# Patient Record
(demographics unavailable — no encounter records)

---

## 2024-11-13 NOTE — HISTORY OF PRESENT ILLNESS
[FreeTextEntry1] : CPE [de-identified] : Here for f/u neck pain and back pain she is requesting massage therapy prescription. Massage and acupuncture are helping her neck pain and sciatica. (Malka Piper massage Therapist and Acupuncturist).  no blood in stool.  c/o constipation chronic. She has vertical pigmented lines on right thumb nail for years- checked by dermatology dr. Carter in summer, 2024.She saw pelvic  in July 2024.  She will see eye doctor dr. Robison due to vision concerns- FH of Macular degeneration.

## 2024-11-13 NOTE — HEALTH RISK ASSESSMENT
[Good] : ~his/her~  mood as  good [No] : In the past 12 months have you used drugs other than those required for medical reasons? No [No falls in past year] : Patient reported no falls in the past year [0] : 2) Feeling down, depressed, or hopeless: Not at all (0) [PHQ-2 Negative - No further assessment needed] : PHQ-2 Negative - No further assessment needed [Never] : Never [NO] : No [No Retinopathy] : No retinopathy [Patient reported mammogram was normal] : Patient reported mammogram was normal [Patient reported PAP Smear was normal] : Patient reported PAP Smear was normal [Patient reported bone density results were abnormal] : Patient reported bone density results were abnormal [Patient reported colonoscopy was normal] : Patient reported colonoscopy was normal [HIV test declined] : HIV test declined [Hepatitis C test declined] : Hepatitis C test declined [None] : None [Alone] : lives alone [Employed] : employed [Graduate School] : graduate school [] :  [Feels Safe at Home] : Feels safe at home [Fully functional (bathing, dressing, toileting, transferring, walking, feeding)] : Fully functional (bathing, dressing, toileting, transferring, walking, feeding) [Fully functional (using the telephone, shopping, preparing meals, housekeeping, doing laundry, using] : Fully functional and needs no help or supervision to perform IADLs (using the telephone, shopping, preparing meals, housekeeping, doing laundry, using transportation, managing medications and managing finances) [Independent] : managing finances [Reports normal functional visual acuity (ie: able to read med bottle)] : Reports normal functional visual acuity [Smoke Detector] : smoke detector [Carbon Monoxide Detector] : carbon monoxide detector [Seat Belt] :  uses seat belt [Sunscreen] : uses sunscreen [With Patient/Caregiver] : , with patient/caregiver [de-identified] : Malka Piper massage Therapist [phone] , [place] acupunture dr. Maverick Ingram [phone] , [place] Mr. Loiue Piper massage Therapist   Charity Rice   [de-identified] : regular, pickle ball, strength training twice  aweek  [de-identified] : regular [[id number]] : 0 [EyeExamDate] : 01/24 [Change in mental status noted] : No change in mental status noted [Language] : denies difficulty with language [Learning/Retaining New Information] : denies difficulty learning/retaining new information [Handling Complex Tasks] : denies difficulty handling complex tasks [Reasoning] : denies difficulty with reasoning [Spatial Ability and Orientation] : denies difficulty with spatial ability and orientation [Sexually Active] : not sexually active [Reports changes in hearing] : Reports no changes in hearing [Reports changes in vision] : Reports no changes in vision [Reports changes in dental health] : Reports no changes in dental health [MammogramDate] : 09/23 [MammogramComments] : dr. Garay  [PapSmearDate] : 08/23 [BoneDensityDate] : 09/23 [BoneDensityComments] : Osteopenia [ColonoscopyDate] : 08/23 [ColonoscopyComments] :  repeat in 10 years,dr. Rodas  [FreeTextEntry2] :   [AdvancecareDate] : 11/24

## 2024-11-13 NOTE — PLAN
[FreeTextEntry1] : weight bearing exercises. massage therapy for neck, Myofacial pain release and sciatica.  fasting labs obtained. Continue healthy diet and exercise.   Fiber gummy, prune, smooth move tea,

## 2025-02-24 NOTE — PHYSICAL EXAM
[No Acute Distress] : no acute distress [Well Nourished] : well nourished [EOMI] : extraocular movements intact [Supple] : supple [Clear to Auscultation] : lungs were clear to auscultation bilaterally [Normal S1, S2] : normal S1 and S2 [No Edema] : there was no peripheral edema [Soft] : abdomen soft [Non Tender] : non-tender [No CVA Tenderness] : no CVA  tenderness [No Rash] : no rash [Coordination Grossly Intact] : coordination grossly intact [No Focal Deficits] : no focal deficits [Normal Gait] : normal gait [Deep Tendon Reflexes (DTR)] : deep tendon reflexes were 2+ and symmetric [Normal Affect] : the affect was normal

## 2025-02-24 NOTE — HISTORY OF PRESENT ILLNESS
[FreeTextEntry8] : Ms. Renetta Aviles is a 66 yo female presents today for symptoms of dizziness/vertigo. Pt reports she woke this morning plopped out bed, and went to have BM. Pt reports then return back to back, plopped back in, suddenly started room spinning dizziness. Pt did have nausea and vomiting once, associated with that head ache. Pt advised today ECG wnl, will get labs, and recommended no sudden head movements, ample hydration advised, and Epley maneuver exercises, trial of meclizine. Pt advised will also provide referral to neurology if symptoms don't lui.

## 2025-04-23 NOTE — HISTORY OF PRESENT ILLNESS
[FreeTextEntry1] : follow up [de-identified] : Ms. Renetta Aviles is a 66 yo female presents today for routine follow up for labs and script for mammogram and DEXA. Pt also reports today feels well. Pt implementing diet and exercise, and lifestyle choices to mitigate hld.

## 2025-05-22 NOTE — PHYSICAL EXAM
[de-identified] : Constitutional: Well-nourished, well-developed, No acute distress Respiratory:  Good respiratory effort, no SOB Lymphatic: No regional lymphadenopathy, no lymphedema Psychiatric: Pleasant and normal affect, alert and oriented x3 Musculoskeletal: normal except where as noted in regional exam   Right Shoulder: APPEARANCE: no marked deformities, no swelling or malalignment POSITIVE TENDERNESS: supraspinatus, long head biceps tendon NONTENDER:  infraspinatus, teres minor. biceps. anterior and posterior capsule. AC joint.  ROM: full with mild painful arc past 60 degrees, no scapular winging or dyskinesia present RESISTIVE TESTING: MMT 4+/5 ER, Flexion and Empty can, 5/5 IR. painless 5/5 resisted ext, horizontal abd/add  SPECIAL TESTS: + Collins and Neers, mildly + cross arm adduction, + Speeds, neg Lai's, neg Drop Arm, neg Apprehension. neg apley's scratch test  [de-identified] : The following radiographs were ordered and read by me during this patient's visit. I reviewed each radiograph in detail with the patient and discussed the findings as highlighted below.   3 views of the right shoulder were obtained today that show no fracture, or dislocation. There are no significant degenerative changes seen, however there is slight superior migration of the humeral head. There is no malalignment. No obvious osseous abnormality. Otherwise unremarkable.  Low Dose Naltrexone Pregnancy And Lactation Text: Naltrexone is pregnancy category C.  There have been no adequate and well-controlled studies in pregnant women.  It should be used in pregnancy only if the potential benefit justifies the potential risk to the fetus.   Limited data indicates that naltrexone is minimally excreted into breastmilk.

## 2025-05-22 NOTE — HISTORY OF PRESENT ILLNESS
[de-identified] : 68 y/o RHD female presenting to the office complaining of right shoulder pain. Patient reports pain for the past 1 year progressively worsening since fall of 2024. The patient describes the pain as a dull aching, and occasionally sharp pain localized to the anterior aspect of her shoulder hat is intermittent in nature. Her symptoms are exacerbated with overhead lifting, lifting heavy objects and repetitive activity any movement of the shoulder. Patient reports the pain is waking her up at night. Patient reports associated weakness and a restricted range of motion. Denies numbness and tingling in the upper extremity. Patient is not taking pain med sat this time. Pt had PT in 2024, this provided mild symptom relief.

## 2025-05-22 NOTE — DISCUSSION/SUMMARY
[de-identified] : Discussed findings of today's exam and possible causes of patient's pain.  Educated patient on their most probable diagnosis of chronic intermittent right shoulder pain with recent atraumatic exacerbation due to subacromial impingement and bicipital tenosynovitis..  Reviewed possible courses of treatment, and we collaboratively decided best course of treatment at this time will include conservative management.  Based on clinical exam it seems that the primary etiology of the patient's pain is her bicipital tenosynovitis.  Patient has already tried oral NSAIDs and activity modification regarding her pickleball without pain relief.  We discussed various treatment options as well as associated risk/benefits/alternatives and patient elected to proceed with ultrasound-guided right biceps tendon sheath cortisone injection today (see procedure note).  Informed the patient that the numbing medicine in today's injection will last for about 4-6 hours. The steroid that was injected will start to work in 1 to 2 days, peak at 1-2 weeks, and may last up to 1-2 months.  If patient has persisting pain would recommend a course of physical therapy.  Follow up as needed.  Patient appreciates and agrees with current plan.  This note was generated using dragon medical dictation software.  A reasonable effort has been made for proofreading its contents, but typos may still remain.  If there are any questions or points of clarification needed please notify my office.

## 2025-05-22 NOTE — PROCEDURE
[de-identified] : Ultrasound-Guided Injection: Right shoulder Long Head Biceps Tendon Sheath. Indication for ultrasound guidance:  Ensure placement within the small/narrow tendon sheath without damage to the tendon Indication for injection: Bicipital Tenosynovitis.  A discussion was had with the patient regarding this procedure and all questions were answered. All risks, benefits and alternatives were discussed. These included but were not limited to bleeding, infection, and allergic reaction.  A timeout was done to ensure correct side and pt agreed to the procedure.   Betadine was used to sterilize and prep the area, and alcohol was used to clean the skin in the anterior aspect of the shoulder over the bicipital groove. Ethyl chloride spray was then used as a topical anesthetic. The long head of the biceps tendon was visualized utlizing the Sonosite II Edge, Linear transducer with the use of sterile gel.  The LH biceps was visualized in the transverse axis and an in-plane approach was used for the injection.  A 25-gauge 1.5" needle was used to inject 2cc of 0.25% bupivacaine without epinephrine and 1cc of 40mg/ml methylprednisolone into the biceps tendon sheath. An image confirming the correct location of the needle placement and infusion of the steroid at the end of the injection was saved on the local device.  A sterile bandage was then applied. The patient tolerated the procedure well and there were no complications.